# Patient Record
Sex: MALE | Race: WHITE
[De-identification: names, ages, dates, MRNs, and addresses within clinical notes are randomized per-mention and may not be internally consistent; named-entity substitution may affect disease eponyms.]

---

## 2017-04-12 ENCOUNTER — HOSPITAL ENCOUNTER (EMERGENCY)
Dept: HOSPITAL 41 - JD.ED | Age: 32
Discharge: HOME | End: 2017-04-12
Payer: SELF-PAY

## 2017-04-12 VITALS — SYSTOLIC BLOOD PRESSURE: 123 MMHG | DIASTOLIC BLOOD PRESSURE: 65 MMHG

## 2017-04-12 DIAGNOSIS — M62.830: Primary | ICD-10-CM

## 2017-04-12 DIAGNOSIS — M54.5: ICD-10-CM

## 2017-04-12 DIAGNOSIS — R01.1: ICD-10-CM

## 2017-04-12 DIAGNOSIS — Z87.891: ICD-10-CM

## 2017-04-12 DIAGNOSIS — K21.9: ICD-10-CM

## 2017-04-12 PROCEDURE — 96372 THER/PROPH/DIAG INJ SC/IM: CPT

## 2017-04-12 PROCEDURE — 99283 EMERGENCY DEPT VISIT LOW MDM: CPT

## 2017-04-12 PROCEDURE — 72100 X-RAY EXAM L-S SPINE 2/3 VWS: CPT

## 2017-04-12 NOTE — EDM.PDOC
ED HPI LOWER BACK PAIN/INJURY





- General


Chief Complaint: Back Pain or Injury


Stated Complaint: LOWER BACK PAIN


Time Seen by Provider: 04/12/17 14:32


Source of Information: Reports: Patient


History Limitations: Reports: No limitations





- History of Present Illness


INITIAL COMMENTS - FREE TEXT/NARRATIVE: 





Presents for evaluation and treatment of low back pain. Patient reports that 

the low-back pain is mostly located on the left side. He states that last night 

he was cleaning house and he bent down to pick something up and felt immediate 

pain into his low back. States that he went to bed when he woke this morning 

the pain seemed to be worse. States that the pain radiates from his left lower 

back into his left hip. It is worse with movement and walking. He reports when 

he lifts his right leg he can feel on his left side. He states he feels best 

when his hips are flexed.





Patient has not taking anything for pain relief. 





 denies any nausea, vomiting, fevers, chills, urinary incontinence, stool 

incontinence, numbness or tingling down the legs.





Patient reports that he was seen in ER about a year ago. He states that he had 

back pain from his sciatic nerve. He is unsure x-rays or imaging was done.





- Related Data


Allergies/ADRs: 


 Allergies











Allergy/AdvReac Type Severity Reaction Status Date / Time


 


No Known Allergies Allergy   Verified 04/12/17 14:25











Home Meds: 


 Home Meds





Ranitidine HCl [Zantac] 150 mg PO Q24H PRN 05/15/16 [History]











Past Medical History


Cardiovascular History: Reports: Heart murmur


Gastrointestinal History: Reports: GERD, Hiatal hernia





Social & Family History





- Tobacco Use


Smoking Status *Q: Former Smoker


Years of Tobacco use: 12


Packs/Tins Daily: 0.5


Used Tobacco, but Quit: Yes


Month Tobacco Last Used: 2 years


Second Hand Smoke Exposure: No





- Caffeine Use


Caffeine Use: Reports: None





- Alcohol Use


Days Per Week of Alcohol Use: 1


Number of Drinks Per Day: 2


Total Drinks Per Week: 2





- Recreational Drug Use


Recreational Drug Use: No





ED ROS GENERAL





- Review of Systems


Review Of Systems: See Below


Constitutional: Denies: fever, chills


GI/Abdominal: Denies: Nausea, Stool incontinence, Vomiting


: Denies: incontinence


Neurological: Denies: Numbness, Tingling





ED EXAM,LOWER BACK PAIN/INJURY





- Physical Exam


Exam: See Below


Exam Limited By: No limitations


General Appearance: alert, WD/WN, no apparent distress


Respiratory/Chest: no respiratory distress, lungs clear, normal breath sounds


Cardiovascular: normal peripheral pulses, regular rate, rhythm, no murmur


Back Exam: normal inspection, paraspinal tenderness (left paraspinal L3-S1).  No

: vertebral tenderness


Extremities: normal inspection


Neurological: alert, normal mood/affect, normal dorsiflexion, normal plantar 

flexion, straight leg raise (R).  No: straight leg raise (L)


Psychiatric: normal affect, normal mood


Skin Exam: Warm, Dry





Course





- Vital Signs


Last Recorded V/S: 


 Last Vital Signs











Temp  36.6 C   04/12/17 14:21


 


Pulse  83   04/12/17 16:14


 


Resp  16   04/12/17 14:21


 


BP  123/65   04/12/17 14:21


 


Pulse Ox  99   04/12/17 16:14














- Orders/Labs/Meds


Orders: 


 Active Orders 24 hr











 Category Date Time Status


 


 Lumbar Spine 2 or 3V [CR] Stat Exams  04/12/17 14:53 Taken











Meds: 


Medications














Discontinued Medications














Generic Name Dose Route Start Last Admin





  Trade Name Brianna  PRN Reason Stop Dose Admin


 


Ketorolac Tromethamine  60 mg  04/12/17 14:53  04/12/17 15:14





  Toradol  IM  04/12/17 14:54  60 mg





  ONETIME ONE   Administration














- Radiology Interpretation


Free Text/Narrative:: 





xray of the lumbar spine shows no acute fractures or dislocations. 





- Re-Assessments/Exams


Free Text/Narrative Re-Assessment/Exam: 





04/12/17 15:39


Patient drove himself here, therefore no narcotic pain medication will be given 

in the ER. 





I reviewed the x-ray results with the patient. Most likely a muscle strain 

causing his symptoms. Will prescribe some muscle relaxers and tramadol for pain 

relief. If the symptoms persist beyond one week he may need an MRI for further 

evaluation to rule out a herniated disc. Follow-up with family medicine for 

this. 





Will discharge home. Discharge instructions as definite











Departure





- Departure


Time of Disposition: 15:44


Disposition: Home, Self-Care 01


Condition: fair


Clinical Impression: 


 Muscle spasm, Lumbago





Instructions:  Muscle Cramps and Spasms, Muscle Pain, Adult


Referrals: 


PCP,None [Primary Care Provider] - 


Forms:  ED Department Discharge, Return to Work/School Form


Additional Instructions: 


Prescription for tramadol 50 mg tabs one tab her for 6 hours #15.


Prescription for Flexeril 10 mg tabs one tab every 8 hours #15 given for 

maintenance.





Activity as tolerated. We recommended to rest but do not be completely bed 

ridden this can often make your symptoms worse. Gentle stretching and range of 

motion as tolerated.





Utilize ice or moist heat to the low back for additional muscle relaxation and 

symptom relief.





He may take over-the-counter Tylenol or Motrin as needed for paiin . He may 

take one tramadol every 4-6 hours as needed for severe pain. Do not drive or 

operate machinery within 12 hours of taking the tramadol. Tramadol can be habit-

forming, recommend you take as few of these as needed to control your pain. 





Take Flexeril 1/2 to 1 tab every 8 hours as needed for muscle spasm. Flexeril 

can make you drowsy. Do not drive or operate machinery after taking this 

medication. 





 expect your symptoms last about one week. Should your symptoms persist beyond 7

-10 days recommended follow up with family medicine or internal medicine. I 

recommend Ian Vega or Venita Valencia. They're located at the [facility]. Call [phone] to scheduled with one of them.





Please return to the ER if your symptoms change or worsen.





- My Orders


Last 24 Hours: 


My Active Orders





04/12/17 14:53


Lumbar Spine 2 or 3V [CR] Stat 














- Assessment/Plan


Last 24 Hours: 


My Active Orders





04/12/17 14:53


Lumbar Spine 2 or 3V [CR] Stat

## 2017-04-13 NOTE — CR
Lumbar spine: AP, lateral and coned-down lateral views centered to the

 lumbosacral junction were obtained.

 

Comparison: No previous study.

 

Vertebral body heights and disc spaces are maintained.  Pedicles as 

well as transverse and spinous processes are intact.  Sacroiliac 

joints are unremarkable.  Posterior disc space narrowing seen at L5-S1

 which is felt to be physiologic.

 

Impression:

1.  No abnormality is identified on three-view lumbar spine study.

 

Diagnostic code #1

## 2019-02-18 ENCOUNTER — HOSPITAL ENCOUNTER (EMERGENCY)
Dept: HOSPITAL 41 - JD.ED | Age: 34
Discharge: HOME | End: 2019-02-18
Payer: COMMERCIAL

## 2019-02-18 VITALS — SYSTOLIC BLOOD PRESSURE: 133 MMHG | DIASTOLIC BLOOD PRESSURE: 80 MMHG

## 2019-02-18 DIAGNOSIS — R10.10: Primary | ICD-10-CM

## 2019-02-18 DIAGNOSIS — F17.210: ICD-10-CM

## 2019-02-18 PROCEDURE — 96374 THER/PROPH/DIAG INJ IV PUSH: CPT

## 2019-02-18 PROCEDURE — 96361 HYDRATE IV INFUSION ADD-ON: CPT

## 2019-02-18 PROCEDURE — 99284 EMERGENCY DEPT VISIT MOD MDM: CPT

## 2019-02-18 PROCEDURE — 85007 BL SMEAR W/DIFF WBC COUNT: CPT

## 2019-02-18 PROCEDURE — 36415 COLL VENOUS BLD VENIPUNCTURE: CPT

## 2019-02-18 PROCEDURE — 82977 ASSAY OF GGT: CPT

## 2019-02-18 PROCEDURE — 83690 ASSAY OF LIPASE: CPT

## 2019-02-18 PROCEDURE — 85027 COMPLETE CBC AUTOMATED: CPT

## 2019-02-18 PROCEDURE — 74177 CT ABD & PELVIS W/CONTRAST: CPT

## 2019-02-18 PROCEDURE — 96375 TX/PRO/DX INJ NEW DRUG ADDON: CPT

## 2019-02-18 PROCEDURE — 80053 COMPREHEN METABOLIC PANEL: CPT

## 2019-02-18 PROCEDURE — 86140 C-REACTIVE PROTEIN: CPT

## 2019-02-18 NOTE — EDM.PDOC
ED HPI GENERAL MEDICAL PROBLEM





- General


Chief Complaint: Abdominal Pain


Stated Complaint: VOMITING AND DIARRHEA AND ABD PAIN


Time Seen by Provider: 02/18/19 18:54


Source of Information: Reports: Patient, RN Notes Reviewed


History Limitations: Reports: No Limitations





- History of Present Illness


INITIAL COMMENTS - FREE TEXT/NARRATIVE: 





Patient is a 33 year old male who presents to the ED for the evaluation of 

abdominal pain and nausea/vomiting. He states that he has been sick for the 

last 3-4 days with nausea/vomiting/diarrhea. He did go to the walk-in clinic on 

friday and was placed on an antibiotic and was told to follow up tomorrow if 

the pain was not better. He notes that the abdominal pain has not gotten 

better. He states that this is in his upper abdomen, but mostly on his RUQ. He 

rates this at a 9/10. He notes that he gets sharp abdominal pains in his RUQ, 

then he vomits and has diarrhea. The wife and the patient note that the volume 

of the diarrhea and vomits have been large. He thinks that his has been around 5

-6 times per day. He notes that he feels chilled and then has the sweats. He 

has not been able to keep much of anything down for fluids or food.


  ** Upper Abdomen


Pain Score (Numeric/FACES): 9





- Related Data


 Allergies











Allergy/AdvReac Type Severity Reaction Status Date / Time


 


dust Allergy  Wheezing Uncoded 02/18/19 18:26











Home Meds: 


 Home Meds





Ranitidine HCl [Zantac] 150 mg PO Q24H PRN 05/15/16 [History]











Past Medical History





- Past Health History


Medical/Surgical History: Denies Medical/Surgical History


Cardiovascular History: Reports: Heart Murmur


Gastrointestinal History: Reports: GERD, Hiatal Hernia


Other Gastrointestinal History: ED visit today for hernia pain


Musculoskeletal History: Reports: Back Pain, Chronic


Other Musculoskeletal History: lower back problems


Neurological History: Reports: Concussion


Psychiatric History: Reports: Anxiety, Depression





- Past Surgical History


GI Surgical History: Reports: Hernia, Inguinal





Social & Family History





- Family History


Family Medical History: Noncontributory


Cardiac: Reports: CAD, MI


Oncologic: Reports: Cervix





- Tobacco Use


Smoking Status *Q: Current Some Day Smoker


Years of Tobacco use: 10


Packs/Tins Daily: 0.5





- Caffeine Use


Caffeine Use: Reports: Coffee, Soda





- Recreational Drug Use


Recreational Drug Use: No





- Living Situation & Occupation


Living situation: Reports: with Significant Other (has girlfriend)


Occupation: Employed





ED ROS GENERAL





- Review of Systems


Review Of Systems: See Below


Constitutional: Reports: Chills


HEENT: Reports: No Symptoms


Respiratory: Reports: No Symptoms


Cardiovascular: Reports: No Symptoms


Endocrine: Reports: No Symptoms


GI/Abdominal: Reports: Abdominal Pain (RUQ), Diarrhea, Nausea, Vomiting


: Reports: No Symptoms


Musculoskeletal: Reports: No Symptoms


Skin: Reports: No Symptoms


Neurological: Reports: No Symptoms


Psychiatric: Reports: No Symptoms


Hematologic/Lymphatic: Reports: No Symptoms


Immunologic: Reports: No Symptoms





ED EXAM, GI/ABD





- Physical Exam


Exam: See Below


Exam Limited By: No Limitations


General Appearance: Alert, WD/WN, Mild Distress (pt appears to be in pain at 

initial evaluation.)


Eyes: Bilateral: Normal Appearance


Ears: Normal External Exam


Nose: Normal Inspection


Throat/Mouth: Normal Inspection, Normal Teeth, Normal Oropharynx, No Airway 

Compromise


Head: Atraumatic, Normocephalic


Neck: Normal Inspection


Respiratory/Chest: No Respiratory Distress, Lungs Clear, Normal Breath Sounds, 

No Accessory Muscle Use, Chest Non-Tender


Cardiovascular: Normal Peripheral Pulses, Regular Rate, Rhythm, No Murmur


GI/Abdominal Exam: Normal Bowel Sounds, Soft, No Distention, Tender (mainly 

over RUQ and epigastrium but is generally tender throughout his entire abdomen.)

, Other (abdomen is typmanitic to percussion).  No: Rigid


Extremities: Normal Inspection, Normal Capillary Refill


Neurological: Alert, Oriented, Normal Cognition, No Motor/Sensory Deficits


Psychiatric: Normal Affect, Normal Mood


Skin Exam: Warm, Dry, Intact, Normal Color, No Rash





Course





- Vital Signs


Last Recorded V/S: 


 Last Vital Signs











Temp  98.9 F   02/18/19 18:30


 


Pulse  97   02/18/19 18:30


 


Resp  16   02/18/19 18:30


 


BP  133/80   02/18/19 18:30


 


Pulse Ox  94 L  02/18/19 18:30














- Orders/Labs/Meds


Orders: 


 Active Orders 24 hr











 Category Date Time Status


 


 Abdomen Pelvis w Cont [CT] Stat Exams  02/18/19 19:16 Ordered











Labs: 


 Laboratory Tests











  02/18/19 02/18/19 Range/Units





  18:40 18:40 


 


WBC  15.78 H   (4.23-9.07)  K/mm3


 


RBC  6.20 H   (4.63-6.08)  M/mm3


 


Hgb  17.1   (13.7-17.5)  gm/L


 


Hct  49.3   (40.1-51.0)  %


 


MCV  79.5   (79.0-92.2)  fl


 


MCH  27.6   (25.7-32.2)  pg


 


MCHC  34.7   (32.2-35.5)  g/dl


 


RDW Std Deviation  42.3   (35.1-43.9)  fL


 


Plt Count  189   (163-337)  K/mm3


 


MPV  11.1   (9.4-12.3)  fl


 


Neutrophils % (Manual)  87 H   (40-60)  %


 


Band Neutrophils %  1   (0-10)  %


 


Lymphocytes % (Manual)  10 L   (20-40)  %


 


Atypical Lymphs %  0   %


 


Monocytes % (Manual)  1 L   (2-10)  %


 


Eosinophils % (Manual)  1   (0.8-7.0)  %


 


Basophils % (Manual)  0 L   (0.2-1.2)  


 


Platelet Estimate  Adequate   


 


RBC Morph Comment  Normal   


 


Sodium   141  (136-145)  mEq/L


 


Potassium   3.4 L  (3.5-5.1)  mEq/L


 


Chloride   104  ()  mEq/L


 


Carbon Dioxide   23  (21-32)  mEq/L


 


Anion Gap   17.4 H  (5-15)  


 


BUN   19 H  (7-18)  mg/dL


 


Creatinine   1.2  (0.7-1.3)  mg/dL


 


Est Cr Clr Drug Dosing   94.13  mL/min


 


Estimated GFR (MDRD)   > 60  (>60)  mL/min


 


BUN/Creatinine Ratio   15.8  (14-18)  


 


Glucose   104  ()  mg/dL


 


Calcium   8.9  (8.5-10.1)  mg/dL


 


Total Bilirubin   0.8  (0.2-1.0)  mg/dL


 


GGT   55  (15-85)  U/L


 


AST   29  (15-37)  U/L


 


ALT   70 H  (16-63)  U/L


 


Alkaline Phosphatase   66  ()  U/L


 


C-Reactive Protein   1.0  (<1.0)  mg/dL


 


Total Protein   7.7  (6.4-8.2)  g/dl


 


Albumin   4.1  (3.4-5.0)  g/dl


 


Globulin   3.6  gm/dL


 


Albumin/Globulin Ratio   1.1  (1-2)  


 


Lipase   120  ()  U/L











Meds: 


Medications














Discontinued Medications














Generic Name Dose Route Start Last Admin





  Trade Name Brianna  PRN Reason Stop Dose Admin


 


Diatrizoate Meglum/Diatrizoate Sod  90 ml  02/18/19 20:43  02/18/19 21:40





  Gastrografin 37%  PO  02/18/19 20:44  90 ml





  ONETIME ONE   Administration





     





     





     





     


 


Hydromorphone HCl  1 mg  02/18/19 19:15  02/18/19 19:30





  Dilaudid  IVPUSH  02/18/19 19:16  1 mg





  ONETIME ONE   Administration





     





     





     





     


 


Sodium Chloride  1,000 mls @ 999 mls/hr  02/18/19 18:52  02/18/19 18:58





  Normal Saline  IV  02/18/19 19:52  999 mls/hr





  ONETIME ONE   Administration





     





     





     





     


 


Iopamidol  100 ml  02/18/19 20:43  02/18/19 21:41





  Isovue-300 (61%)  IVPUSH  02/18/19 20:44  100 ml





  ONETIME ONE   Administration





     





     





     





     


 


Metoclopramide HCl  10 mg  02/18/19 20:47  02/18/19 21:11





  Reglan  IVPUSH  02/18/19 20:48  10 mg





  ONETIME ONE   Administration





     





     





     





     


 


Ondansetron HCl  4 mg  02/18/19 18:51  02/18/19 18:57





  Zofran  IVPUSH  02/18/19 18:52  4 mg





  ONETIME ONE   Administration





     





     





     





     


 


Sodium Chloride  10 ml  02/18/19 20:43  02/18/19 21:40





  Saline Flush  FLUSH  02/18/19 20:44  10 ml





  ONETIME ONE   Administration





     





     





     





     














- Re-Assessments/Exams


Free Text/Narrative Re-Assessment/Exam: 





02/18/19 19:30


Pt presents to the ED for the evaluation of ongoing abdominal pain and nausea/

vomiting/diarrhea. I have ordered Lipase, CRP, CBC, CMP, GGT, 4 mg IV Zofran, 

IV fluid bolus, and abdomen/pelvis CT w contrast for initial evaluation of his 

abdominal pain. He has a rather sizeable abdomen, but notes this is normal for 

him. It is tympanitic which is a little concerning. I am unsure of the etiology 

of his pain and other symptoms, but he does still have a gallbladder and his 

appendix.





02/18/19 20:50


Pt re-assessed at bedside, and he states that he is nauseous. His WBC is 

elevated at 15,000 but CRP, lipase, and GGT are normal. I am still unsure of 

source of his issues or if the WBC is part of stress reaction to his symptoms. 

Abdomen CT is still pending, as he is still drinking the oral contrast.





02/18/19 22:41


Pt CT is done and read by [facility]: simple cysts in both kidneys, enlarged liver 

and spleen, no focal mass is seen, Normal appendix. No acute abnormality in 

abdomen or pelvis in this exam. Pt was made aware of this and the results were 

discussed at bedside. He states that he has a follow up appointment at [facility] tomorrow, I have advised him to keep this and maybe ask about a referral 

for GI or upper endoscopy to see if he has an ulcer.














Departure





- Departure


Time of Disposition: 22:56


Disposition: Home, Self-Care 01


Condition: Fair


Clinical Impression: 


Abdominal pain


Qualifiers:


 Abdominal location: upper abdomen, unspecified Qualified Code(s): R10.10 - 

Upper abdominal pain, unspecified








- Discharge Information


*PRESCRIPTION DRUG MONITORING PROGRAM REVIEWED*: No


*COPY OF PRESCRIPTION DRUG MONITORING REPORT IN PATIENT GT: No


Instructions:  Abdominal Pain, Adult, Easy-to-Read


Forms:  ED Department Discharge


Additional Instructions: 


You have been evaluated in the ED for your upper abdominal pain.





Your workup in the ED tonight was fairly unremarkable. Your CT did not 

demonstrate any acute abnormality. Your labs were within normal limits, Your 

WBC was elevated but this is felt to be due to stress reaction.





Recommend taking Omeprazole OTC daily for management of heartburn symptoms.





Keep your appointment with [facility] tomorrow and ask them about a possible GI 

referral or general surgery for upper endoscopy for evaluation of possible 

ulcer.





There is no definite answer for your symptoms tonight.





Please take the prescribed medications as directed.





Please return to the ED if your symptoms change or worsen.





- My Orders


Last 24 Hours: 


My Active Orders





02/18/19 19:16


Abdomen Pelvis w Cont [CT] Stat 














- Assessment/Plan


Last 24 Hours: 


My Active Orders





02/18/19 19:16


Abdomen Pelvis w Cont [CT] Stat

## 2019-02-19 NOTE — CT
CT abdomen and pelvis

 

Technique: Multiple axial sections were obtained from above the dome 

of the diaphragm inferiorly through the pubic symphysis.  Intravenous 

and oral contrast was utilized.  Delayed images were also obtained 

through the abdomen and pelvis.

 

Comparison: Prior noncontrast CT abdomen and pelvis exam of 11/02/18.

 

Findings: Small portion of the visualized lung bases shows nothing 

acute.

 

Liver shows fatty infiltration.  No focal abnormality is seen within 

the liver.  Spleen appears within normal limits.  Adrenal glands show 

no nodule.  Pancreas is within normal limits.  Gallbladder contains no

 calcified gallstones.  Small cysts are noted within both kidneys.  

Kidneys show symmetric contrast enhancement without hydronephrosis.

 

Pancreas is within normal limits.  Aorta shows no aneurysm.  No 

retroperitoneal adenopathy or mesenteric abnormalities are seen.  No 

pelvic mass or adenopathy is seen.

 

Delayed images show contrast within nondilated ureters.  Contrast is 

noted within the bladder.

 

Small fat-containing inguinal hernia is seen.  Small fat-containing 

anterior abdominal wall hernia at the umbilicus.  Other abdominal wall

 hernia seen on prior study more superior to the umbilicus is not seen

 on current exam.

 

Bone window settings were reviewed which appear within normal limits 

for the patient's age.  Appendix is seen and is normal in size.

 

Impression:

1.  Fatty infiltration within the liver.

2.  Multiple small cysts within both kidneys.

3.  Nothing acute is seen on CT study of the abdomen and pelvis.

 

Diagnostic code #2

 

I agree with preliminary report from [facility], finalized on 02/18/19, 

11:36 PM Central Time